# Patient Record
Sex: MALE | Race: WHITE | NOT HISPANIC OR LATINO | Employment: OTHER | URBAN - NONMETROPOLITAN AREA
[De-identification: names, ages, dates, MRNs, and addresses within clinical notes are randomized per-mention and may not be internally consistent; named-entity substitution may affect disease eponyms.]

---

## 2020-06-24 ENCOUNTER — OUTSIDE FACILITY SERVICE (OUTPATIENT)
Dept: CARDIOLOGY | Facility: CLINIC | Age: 40
End: 2020-06-24

## 2020-06-24 PROCEDURE — 93018 CV STRESS TEST I&R ONLY: CPT | Performed by: INTERNAL MEDICINE

## 2020-06-24 PROCEDURE — 78452 HT MUSCLE IMAGE SPECT MULT: CPT | Performed by: INTERNAL MEDICINE

## 2022-05-20 ENCOUNTER — OUTSIDE FACILITY SERVICE (OUTPATIENT)
Dept: CARDIOLOGY | Facility: CLINIC | Age: 42
End: 2022-05-20

## 2022-05-20 PROCEDURE — 93018 CV STRESS TEST I&R ONLY: CPT | Performed by: INTERNAL MEDICINE

## 2022-05-20 PROCEDURE — 78452 HT MUSCLE IMAGE SPECT MULT: CPT | Performed by: INTERNAL MEDICINE

## 2023-10-17 ENCOUNTER — OFFICE VISIT (OUTPATIENT)
Dept: NEUROSURGERY | Facility: CLINIC | Age: 43
End: 2023-10-17
Payer: MEDICARE

## 2023-10-17 VITALS — WEIGHT: 156.8 LBS | BODY MASS INDEX: 23.76 KG/M2 | TEMPERATURE: 97.7 F | HEIGHT: 68 IN

## 2023-10-17 DIAGNOSIS — M51.16 LUMBAR DISC HERNIATION WITH RADICULOPATHY: Primary | ICD-10-CM

## 2023-10-17 DIAGNOSIS — Z72.0 TOBACCO ABUSE: ICD-10-CM

## 2023-10-17 PROCEDURE — 1160F RVW MEDS BY RX/DR IN RCRD: CPT | Performed by: NEUROLOGICAL SURGERY

## 2023-10-17 PROCEDURE — 1159F MED LIST DOCD IN RCRD: CPT | Performed by: NEUROLOGICAL SURGERY

## 2023-10-17 PROCEDURE — 99204 OFFICE O/P NEW MOD 45 MIN: CPT | Performed by: NEUROLOGICAL SURGERY

## 2023-10-17 RX ORDER — ISOSORBIDE MONONITRATE 30 MG/1
TABLET, EXTENDED RELEASE ORAL EVERY 24 HOURS
COMMUNITY

## 2023-10-17 RX ORDER — ASPIRIN 81 MG/1
TABLET ORAL EVERY 24 HOURS
COMMUNITY

## 2023-10-17 RX ORDER — ISOSORBIDE MONONITRATE 60 MG/1
60 TABLET, EXTENDED RELEASE ORAL EVERY MORNING
COMMUNITY
Start: 2023-07-19

## 2023-10-17 RX ORDER — POTASSIUM CHLORIDE 750 MG/1
TABLET, FILM COATED, EXTENDED RELEASE ORAL
COMMUNITY
Start: 2023-10-16

## 2023-10-17 RX ORDER — TICAGRELOR 60 MG/1
TABLET ORAL EVERY 12 HOURS SCHEDULED
COMMUNITY

## 2023-10-17 RX ORDER — ATORVASTATIN CALCIUM 40 MG/1
TABLET, FILM COATED ORAL EVERY 24 HOURS
COMMUNITY

## 2023-10-17 RX ORDER — RANOLAZINE 1000 MG/1
TABLET, EXTENDED RELEASE ORAL EVERY 12 HOURS SCHEDULED
COMMUNITY

## 2023-10-17 RX ORDER — TIZANIDINE HYDROCHLORIDE 4 MG/1
TABLET ORAL EVERY 8 HOURS SCHEDULED
COMMUNITY
Start: 2023-08-15

## 2023-10-17 RX ORDER — GABAPENTIN 300 MG/1
300 CAPSULE ORAL TAKE AS DIRECTED
Qty: 90 CAPSULE | Refills: 1 | Status: SHIPPED | OUTPATIENT
Start: 2023-10-17

## 2023-10-17 RX ORDER — IBUPROFEN 800 MG/1
TABLET ORAL
COMMUNITY
Start: 2023-10-16

## 2023-10-17 NOTE — PROGRESS NOTES
Patient: Jose De Jesus Delong  : 1980    Primary Care Provider: Roberta Abrams APRN    Requesting Provider: As above        History    Chief Complaint: Low back and right leg pain.    History of Present Illness: Mr. Delong is a 43-year-old unemployed gentleman who 2 months ago was helping a friend lift a heavy nadir.  He felt a pop in his back.  He was seen in the emergency room.  His symptoms improved but have recurred somewhat.  Pain extends from his back into the right hip and then globally in the right leg although most of the pain seems to be on the back of the right thigh and calf.  His right leg will give out on him from time to time.  He has no left leg symptoms.  He denies bowel or bladder dysfunction.  He has done some physical therapy.  He has had a steroid injection and been treated with tramadol.  He is a little better lying on his right side.  He has had multiple heart attacks and has a coronary stent that was placed in 2018 or so.  He is on Brilinta.    Review of Systems   Constitutional:  Negative for activity change, appetite change, chills, diaphoresis, fatigue, fever and unexpected weight change.   HENT:  Negative for congestion, dental problem, drooling, ear discharge, ear pain, facial swelling, hearing loss, mouth sores, nosebleeds, postnasal drip, rhinorrhea, sinus pressure, sinus pain, sneezing, sore throat, tinnitus, trouble swallowing and voice change.    Eyes:  Negative for photophobia, pain, discharge, redness, itching and visual disturbance.   Respiratory:  Negative for apnea, cough, choking, chest tightness, shortness of breath, wheezing and stridor.    Cardiovascular:  Negative for chest pain, palpitations and leg swelling.   Gastrointestinal:  Negative for abdominal distention, abdominal pain, anal bleeding, blood in stool, constipation, diarrhea, nausea, rectal pain and vomiting.   Endocrine: Negative for cold intolerance, heat intolerance, polydipsia, polyphagia and polyuria.  "  Genitourinary:  Negative for decreased urine volume, difficulty urinating, dysuria, enuresis, flank pain, frequency, genital sores, hematuria, penile discharge, penile pain, penile swelling, scrotal swelling, testicular pain and urgency.   Musculoskeletal:  Positive for arthralgias and back pain. Negative for gait problem, joint swelling, myalgias, neck pain and neck stiffness.   Skin:  Negative for color change, pallor, rash and wound.   Allergic/Immunologic: Negative for environmental allergies, food allergies and immunocompromised state.   Neurological:  Positive for weakness and numbness. Negative for dizziness, tremors, seizures, syncope, facial asymmetry, speech difficulty, light-headedness and headaches.   Hematological:  Negative for adenopathy. Does not bruise/bleed easily.   Psychiatric/Behavioral:  Negative for agitation, behavioral problems, confusion, decreased concentration, dysphoric mood, hallucinations, self-injury, sleep disturbance and suicidal ideas. The patient is not nervous/anxious and is not hyperactive.        The patient's past medical history, past surgical history, family history, and social history have been reviewed at length in the electronic medical record.      Physical Exam:   Temp 97.7 °F (36.5 °C) (Infrared)   Ht 172.7 cm (68\")   Wt 71.1 kg (156 lb 12.8 oz)   BMI 23.84 kg/m²   CONSTITUTIONAL: Patient is well-nourished, pleasant and appears stated age.  MUSCULOSKELETAL:  Straight leg raising is positive on the right at about 30 degrees.  Left-sided straight leg raising induces right hip pain.  Benjie's Sign is negative.  ROM in the low back is normal.  Tenderness in the back to palpation is not observed.  NEUROLOGICAL:  Orientation, memory, attention span, language function, and cognition have been examined and are intact.  Strength is intact in the lower extremities to direct testing.  Muscle tone is normal throughout.  Station and gait are normal.  Sensation is intact to " light touch testing throughout.  Deep tendon reflexes are 1+ and symmetrical.  Coordination is intact.      Medical Decision Making    Data Review:   (All imaging studies were personally reviewed unless stated otherwise)  MRI of the lumbar spine dated 9/7/2023 demonstrates degenerative disc disease at L3-4 and L4-5 where there is some degree of prominent disc bulging or protrusion that narrows the recesses.  This is perhaps most prominent on the right at the L4-5 level.    Diagnosis:   1.  Lumbar radiculopathy.  2.  Lumbar degenerative disc disease.    Treatment Options:   I have placed the patient on gabapentin in escalating doses.  He will follow-up in our clinic in several weeks.  If his symptoms persist then we will make an arrangement for referral to a pain clinic for some blocks.  Surgery would be a consideration if his difficulties persist.  He would need to come off of his Brilinta for injections or surgery.      Scribed for Babatunde Pelayo MD by Teresa Hallman CMA on 10/17/2023 12:31 EDT       I, Dr. Pelayo, personally performed the services described in the documentation, as scribed in my presence, and it is both accurate and complete.

## 2023-11-22 ENCOUNTER — OFFICE VISIT (OUTPATIENT)
Dept: NEUROSURGERY | Facility: CLINIC | Age: 43
End: 2023-11-22
Payer: MEDICARE

## 2023-11-22 VITALS
DIASTOLIC BLOOD PRESSURE: 62 MMHG | WEIGHT: 160 LBS | TEMPERATURE: 97.7 F | BODY MASS INDEX: 24.25 KG/M2 | SYSTOLIC BLOOD PRESSURE: 118 MMHG | HEIGHT: 68 IN

## 2023-11-22 DIAGNOSIS — M51.16 LUMBAR DISC HERNIATION WITH RADICULOPATHY: Primary | ICD-10-CM

## 2023-11-22 DIAGNOSIS — Z72.0 TOBACCO ABUSE: ICD-10-CM

## 2023-11-22 DIAGNOSIS — M51.36 DISC DEGENERATION, LUMBAR: ICD-10-CM

## 2023-11-22 PROCEDURE — 99213 OFFICE O/P EST LOW 20 MIN: CPT | Performed by: PHYSICIAN ASSISTANT

## 2023-11-22 PROCEDURE — 1159F MED LIST DOCD IN RCRD: CPT | Performed by: PHYSICIAN ASSISTANT

## 2023-11-22 PROCEDURE — 1160F RVW MEDS BY RX/DR IN RCRD: CPT | Performed by: PHYSICIAN ASSISTANT

## 2023-11-22 RX ORDER — NITROGLYCERIN 0.4 MG/1
0.4 TABLET SUBLINGUAL
COMMUNITY

## 2023-11-22 RX ORDER — GABAPENTIN 600 MG/1
600 TABLET ORAL 3 TIMES DAILY
Qty: 90 TABLET | Refills: 1 | Status: SHIPPED | OUTPATIENT
Start: 2023-11-22

## 2023-11-22 NOTE — PROGRESS NOTES
Outside MRI disc of lumbar spine loaded into pt's chart and returned to pt in exam room.    I will STOP taking the medications listed below when I get home from the hospital:  None

## 2023-11-22 NOTE — PROGRESS NOTES
Patient: Jose De Jesus Delong  : 1980  Chart #: [id number]    Date of Service: 2023    CHIEF COMPLAINT: Low back and right leg pain    History of Present Illness Mr. Delong is seen in follow-up.  He is a 43-year-old gentleman who has a history of multiple MIs with a stent placed in 2018.  He is on Brilinta.  Around April of this year he was helping a friend lift a heavy nadir when he felt a pop in his back followed by pain radiating down the right leg.  He was evaluated in the emergency room.  Symptoms improved but then recurred somewhat.  His right leg will give out on him on occasion.  No significant left leg symptoms.  No bowel or bladder difficulties.  He has been treated with physical therapy.  He has been treated with steroids and tramadol.  He feels a little better lying on his right side.  More recently Dr. Pelayo prescribed Neurontin which has helped modestly.      Past Medical History:   Diagnosis Date    Carotid artery occlusion     Cervical disc disorder     Coronary artery disease 2018    Heart attack     x3         Current Outpatient Medications:     aspirin 81 MG EC tablet, Daily., Disp: , Rfl:     atorvastatin (LIPITOR) 40 MG tablet, Daily., Disp: , Rfl:     Brilinta 60 MG tablet tablet, Every 12 (Twelve) Hours., Disp: , Rfl:     gabapentin (NEURONTIN) 300 MG capsule, Take 1 capsule by mouth Take As Directed. 1 nightly for 3 days, 1 twice a day for 3 days, 1 three times a day thereafter, Disp: 90 capsule, Rfl: 1    ibuprofen (ADVIL,MOTRIN) 800 MG tablet, , Disp: , Rfl:     isosorbide mononitrate (IMDUR) 30 MG 24 hr tablet, Daily., Disp: , Rfl:     metoprolol tartrate (LOPRESSOR) 25 MG tablet, Every 12 (Twelve) Hours., Disp: , Rfl:     nitroglycerin (NITROSTAT) 0.4 MG SL tablet, 1 tablet., Disp: , Rfl:     potassium chloride 10 MEQ CR tablet, , Disp: , Rfl:     ranolazine (RANEXA) 1000 MG 12 hr tablet, Every 12 (Twelve) Hours., Disp: , Rfl:     Zanaflex 4 MG tablet, Every 8 (Eight) Hours.,  "Disp: , Rfl:     gabapentin (NEURONTIN) 600 MG tablet, Take 1 tablet by mouth 3 (Three) Times a Day., Disp: 90 tablet, Rfl: 1    Past Surgical History:   Procedure Laterality Date    CAROTID STENT      CORONARY STENT PLACEMENT  03/2018    \" maker\"       Social History     Socioeconomic History    Marital status:    Tobacco Use    Smoking status: Every Day     Packs/day: 2.00     Years: 30.00     Additional pack years: 0.00     Total pack years: 60.00     Types: Cigarettes    Smokeless tobacco: Never   Substance and Sexual Activity    Alcohol use: Never    Drug use: Never    Sexual activity: Defer         Review of Systems   Constitutional:  Negative for activity change, appetite change, chills, diaphoresis, fatigue, fever and unexpected weight change.   HENT:  Negative for congestion, dental problem, drooling, ear discharge, ear pain, facial swelling, hearing loss, mouth sores, nosebleeds, postnasal drip, rhinorrhea, sinus pressure, sinus pain, sneezing, sore throat, tinnitus, trouble swallowing and voice change.    Eyes:  Negative for photophobia, pain, discharge, redness, itching and visual disturbance.   Respiratory:  Negative for apnea, cough, choking, chest tightness, shortness of breath, wheezing and stridor.    Cardiovascular:  Negative for chest pain, palpitations and leg swelling.   Gastrointestinal:  Positive for abdominal pain. Negative for abdominal distention, anal bleeding, blood in stool, constipation, diarrhea, nausea, rectal pain and vomiting.   Endocrine: Negative for cold intolerance, heat intolerance, polydipsia, polyphagia and polyuria.   Genitourinary:  Negative for decreased urine volume, difficulty urinating, dysuria, enuresis, flank pain, frequency, genital sores, hematuria, penile discharge, penile pain, penile swelling, scrotal swelling, testicular pain and urgency.   Musculoskeletal:  Positive for back pain and myalgias. Negative for arthralgias, gait problem, joint swelling, " "neck pain and neck stiffness.   Skin:  Negative for color change, pallor, rash and wound.   Allergic/Immunologic: Positive for food allergies. Negative for environmental allergies and immunocompromised state.   Neurological:  Positive for numbness. Negative for dizziness, tremors, seizures, syncope, facial asymmetry, speech difficulty, weakness, light-headedness and headaches.   Hematological:  Negative for adenopathy. Does not bruise/bleed easily.   Psychiatric/Behavioral:  Negative for agitation, behavioral problems, confusion, decreased concentration, dysphoric mood, hallucinations, self-injury, sleep disturbance and suicidal ideas. The patient is not nervous/anxious and is not hyperactive.        Objective   Vital Signs: Blood pressure 118/62, temperature 97.7 °F (36.5 °C), temperature source Infrared, height 172.7 cm (68\"), weight 72.6 kg (160 lb).  Physical Exam  Vitals and nursing note reviewed.   Constitutional:       General: He is not in acute distress.     Appearance: He is well-developed.   HENT:      Head: Normocephalic and atraumatic.   Psychiatric:         Behavior: Behavior normal.         Thought Content: Thought content normal.     Musculoskeletal:     Strength is intact in upper and lower extremities to direct testing.     Station and gait are normal.     Straight leg raising is negative.   Neurologic:     Muscle tone is normal throughout.     Coordination is intact.     Deep tendon reflexes: 2+ and symmetrical.     Sensation is intact to light touch throughout.     Patient is oriented to person, place, and time.         Independent review of radiographic imaging: MRI of the lumbar spine dated 9/7/2023 demonstrates degenerative disc disease most pronounced at L3-4 and L4-5.  There is some disc protrusion that narrows the recess on the right at L4-5 which could be the source for his symptoms.  There is some rightward narrowing at L3-4 to a lesser extent.    Assessment & Plan   Diagnosis: Lumbar " degenerative disc disease with radiculopathy    Medical Decision Making: I am going to refer patient to the pain clinic for an epidural or 2.  Gabapentin was increased to 600 3 times daily.  Ultimately if symptoms do not improve then surgery is a consideration.  Per his report, he has been cleared to come off of Brilinta for 5 days prior to procedure.              Diagnoses and all orders for this visit:    1. Lumbar disc herniation with radiculopathy (Primary)  -     Cancel: Ambulatory Referral to Pain Management  -     gabapentin (NEURONTIN) 600 MG tablet; Take 1 tablet by mouth 3 (Three) Times a Day.  Dispense: 90 tablet; Refill: 1  -     Ambulatory Referral to Pain Management    2. Tobacco abuse  -     Cancel: Ambulatory Referral to Pain Management  -     gabapentin (NEURONTIN) 600 MG tablet; Take 1 tablet by mouth 3 (Three) Times a Day.  Dispense: 90 tablet; Refill: 1  -     Ambulatory Referral to Pain Management    3. Disc degeneration, lumbar                        BMI is within normal parameters. No other follow-up for BMI required.         Yelena Calderón PA-C  Patient Care Team:  Roberta Abrams APRN as PCP - General (Family Medicine)

## 2024-01-10 NOTE — PROGRESS NOTES
"Chief Complaint: \"Lower back and right leg pain\"      History of Present Illness:   Patient: Mr. Jose De Jesus Delong, 43 y.o. male   Referring Physician: Yelena Calderón PA-C   Reason for Referral: Consultation for chronic intractable lower back and right lower extremity pain.   Pain History: Jose De Jesus Delong reports a 10-month history of chronic intractable lower back and right lower extremity pain, which began around April 2023 when he was helping a friend lift a heavy nadir. He felt a pop in his back followed by severe pain radiating down his right leg. Jose De Jesus Delong was evaluated in the emergency room.  He experienced transient improvement in his symptoms. Unfortunately, his pain recurred thereafter. He complains of lower back pain radiating into the right posterior thigh and right calf with tingling in his right foot associated with intermittent RLE weakness and neurogenic claudication. He denies significant left lower extremity symptoms. He denies bowel or bladder problems. 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 He is on ASA and Brilinta. MRI of the lumbar spine without contrast on 09/07/2023 revealed multilevel disc disease, facet hypertrophy and ligamentum flavum hypertrophy most pronounced at L3-L4 and L4-L5. At L3-L4: Disc bulge, spurring of the endplates, facet hypertrophy, ligamentum flavum hypertrophy. Mild canal stenosis and moderate right lateral recess and neuroforaminal stenosis. At L4-L5: Moderate spurring of the endplates, facet hypertrophy, ligamentum flavum hypertrophy, disc bulge with disc protrusion that narrows the right lateral recess. Moderate canal stenosis, moderate lateral recess and neuroforaminal stenosis. The findings at L4-L5 and perhaps L3-L4 could provide clinical and radiological correlation. Pain has progressed in intensity over the past months. Jose De Jesus Delong failed to obtain pain " relief with conservative measures for more than 9 months including oral analgesics, Neurontin, steroids, opioids (tramadol), topical analgesics, ice, heat, physical therapy (last visit within the past 6 months), physical therapist directed home exercise program HEP (ongoing), to name a few. Jose De Jesus Delong underwent neurosurgical consultation with Yelena Calderón PA-C on 11/22/2023, and was found not to be a surgical candidate. if symptoms do not improve, then surgery could be considered. The neurosurgical team has referred the patient in consultation for selective epidural steroid injections  Pain Description: Constant lower back pain with intermittent exacerbation, described as aching, dull, sharp, throbbing, and burning sensation.   Radiation of Pain: The pain radiates into the right gluteal region, right posterior and lateral thigh and right calf  Pain intensity today: 6/10   Average pain intensity last week: 3/10  Pain intensity ranges from: 4/10 to 10/10  Aggravating factors: Pain increases with lifting, protracted sitting, standing, walking. Patient describes neurogenic claudication. Patient does not use a cane or walker   Alleviating factors: Pain decreases with sitting on a recliner, lying down on his right side  Associated Symptoms:   Patient reports pain, numbness, weakness in the right lower extremity. Patient denies symptoms in the opposite limb  Patient denies any new bladder or bowel problems.   Patient reports difficulties with his balance but denies recent falls.   Pain interferes with general activities (ability to walk, stand, transition from different positions), and affects patient's quality of life  Pain interferes with sleep: falling asleep and causing sleep fragmentation   Muscle spasms: RLE  Stiffness: Lower back and right leg    Review of previous therapies and additional medical records:  Jose De Jesus Delong has already failed the following measures, including:   Conservative Measures: Oral  analgesics, Neurontin, steroids, opioids (tramadol), topical analgesics, ice, heat, physical therapy (last visit within the past 6 months), physical therapist directed home exercise program HEP (ongoing)  Interventional Measures: None  Surgical Measures: No history of previous cervical spine, lumbar spine or hip surgery   Jose De Jesus Delong underwent neurosurgical consultation with Yelena Calderón PA-C on 11/22/2023, and was found not to be a surgical candidate. if symptoms do not improve, then surgery could be considered.    Jose De Jesus Delong presents with significant comorbidities including history of carotid artery occlusion, coronary artery disease s/p coronary stent placement (03/2018) and carotid stent placement, on aspirin 81 mg, Brilinta   In terms of current analgesics, Jose De Jesus Delong takes: Gabapentin, ibuprofen, Zanaflex   I have reviewed Kamari Report consistent with medication reconciliation.  SOAPP/ORT: Low Risk     PHQ-9 Depression Screening  Little interest or pleasure in doing things? 2-->more than half the days   Feeling down, depressed, or hopeless? 0-->not at all   Trouble falling or staying asleep, or sleeping too much? 2-->more than half the days   Feeling tired or having little energy? 2-->more than half the days   Poor appetite or overeating? 2-->more than half the days   Feeling bad about yourself - or that you are a failure or have let yourself or your family down? 0-->not at all   Trouble concentrating on things, such as reading the newspaper or watching television? 0-->not at all   Moving or speaking so slowly that other people could have noticed? Or the opposite - being so fidgety or restless that you have been moving around a lot more than usual? 1-->several days   Thoughts that you would be better off dead, or of hurting yourself in some way? 0-->not at all   PHQ-9 Total Score 9   If you checked off any problems, how difficult have these problems made it for you to do your work, take  care of things at home, or get along with other people?        Pain Self-Efficacy Questionnaire (PSEQ)  ITEM 01-11 2024        I can enjoy things despite the pain. 3        I can do most of the household chores (tidying up, washing dishes, etc), despite the pain. 4        I can socialize with my friends or family members as often as I used to do, despite the pain. 2        I can cope with my pain in most situations. 1        I can do some form of work, despite the pain (includes housework, paid, and unpaid work). 1        I can still do many of the things I enjoy doing, such as hobbies or leisure activity despite pain. 1        I can cope with my pain without medications. 1        I can accomplish most of my goals in life despite the pain. 1        I can live in a normal lifestyle, despite the pain. 1        I can gradually become more active, despite the pain. 1        TOTAL SCORE 16/60            Global Pain Scale 01-11 2024          Pain 19          Feelings 5          Clinical outcomes 11          Activities 14          GPS Total: 49              The [place] Back Pain Disability Scale   DATE 01-11 2024          Sleep through the night 3          Turn over in bed 3          Get out of bed 3          Make your bed 2          Put on socks (pantyhose) 3          Ride in a car 2          Sit in a chair for several hours 2          Stand up for 20-30 minutes 2          Climb one flight of stairs 2          Walk a few blocks (200-300 yards)  2          Walk several miles 3          Run one block (about 50 yards) 3          Take food out of the refrigerator 2          Reach up to high shelves 2          Move a chair 2          Pull or push heavy doors 2          Bend over to clean the bathtub 2          Throw a ball 1          Carry two bags of groceries 2          Lift and carry a heavy suitcase 3          Total score 46            Review of Diagnostic Studies:  I have independently reviewed and interpreted the  images with the patient and used the images and a tridimensional spine model to explain findings. I have also reviewed the reports.  MRI of the lumbar spine without contrast on 09/07/2023 revealed preservation of vertebral body heights and alignment. Multilevel disc disease, facet hypertrophy and ligamentum flavum hypertrophy most pronounced at L3-L4 and L4-L5. Axial imaging:  T11-T12, T12-L1, L1-L2, L2-L3: No significant canal or foraminal stenosis  L3-L4: Disc bulge, spurring of the endplates, facet hypertrophy, ligamentum flavum hypertrophy. Mild canal stenosis and moderate right lateral recess and neuroforaminal stenosis  L4-L5: Moderate spurring of the endplates, facet hypertrophy, ligamentum flavum hypertrophy, disc bulge with disc protrusion that narrows the right lateral recess. Moderate canal stenosis, moderate lateral recess and neuroforaminal stenosis  L5-S1: Facet hypertrophy, ligamentum flavum hypertrophy.  No significant canal lateral recess or neuroforaminal stenosis.     Review of Systems   Respiratory:  Positive for shortness of breath and wheezing.    Cardiovascular:  Positive for chest pain, palpitations and leg swelling.   Gastrointestinal:  Positive for abdominal pain.   Musculoskeletal:  Positive for arthralgias, back pain, myalgias, neck pain and neck stiffness.   Allergic/Immunologic: Positive for food allergies.   Neurological:  Positive for weakness and numbness.   All other systems reviewed and are negative.        Patient Active Problem List   Diagnosis    Lumbar disc herniation with radiculopathy    Lumbar stenosis with neurogenic claudication    Degeneration of lumbar or lumbosacral intervertebral disc    Current every day smoker    Encounter for smoking cessation counseling    History of coronary artery stent placement       Past Medical History:   Diagnosis Date    Carotid artery occlusion     Cervical disc disorder     Coronary artery disease 2018    Heart attack     x3         Past  "Surgical History:   Procedure Laterality Date    CAROTID STENT      CORONARY STENT PLACEMENT  03/2018    \" maker\"         History reviewed. No pertinent family history.      Social History     Socioeconomic History    Marital status:    Tobacco Use    Smoking status: Every Day     Packs/day: 2.00     Years: 30.00     Additional pack years: 0.00     Total pack years: 60.00     Types: Cigarettes    Smokeless tobacco: Never   Substance and Sexual Activity    Alcohol use: Never    Drug use: Never    Sexual activity: Defer           Current Outpatient Medications:     aspirin 81 MG EC tablet, Daily., Disp: , Rfl:     atorvastatin (LIPITOR) 40 MG tablet, Daily., Disp: , Rfl:     Brilinta 60 MG tablet tablet, Every 12 (Twelve) Hours., Disp: , Rfl:     gabapentin (NEURONTIN) 600 MG tablet, Take 1 tablet by mouth 3 (Three) Times a Day., Disp: 90 tablet, Rfl: 1    ibuprofen (ADVIL,MOTRIN) 800 MG tablet, , Disp: , Rfl:     isosorbide mononitrate (IMDUR) 30 MG 24 hr tablet, Daily., Disp: , Rfl:     metoprolol tartrate (LOPRESSOR) 25 MG tablet, Every 12 (Twelve) Hours., Disp: , Rfl:     nitroglycerin (NITROSTAT) 0.4 MG SL tablet, 1 tablet., Disp: , Rfl:     potassium chloride 10 MEQ CR tablet, , Disp: , Rfl:     ranolazine (RANEXA) 1000 MG 12 hr tablet, Every 12 (Twelve) Hours., Disp: , Rfl:     Zanaflex 4 MG tablet, Every 8 (Eight) Hours., Disp: , Rfl:     Alpha Lipoic Acid 200 MG capsule, Take 400 mg by mouth 3 (Three) Times a Day., Disp: 180 capsule, Rfl: 1    Dietary Management Product (Rheumate) capsule, Take 1 capsule by mouth Daily., Disp: 90 capsule, Rfl: 0    nortriptyline (PAMELOR) 10 MG capsule, 1-2 tabs po qhs prn sleep, Disp: 60 capsule, Rfl: 0    vitamin B-6 (PYRIDOXINE) 100 MG tablet, Take 1 tablet by mouth Daily., Disp: 30 tablet, Rfl: 0      Allergies   Allergen Reactions    Shellfish-Derived Products Anaphylaxis    Bee Venom Swelling         Ht 172.7 cm (68\")   Wt 76.3 kg (168 lb 3.2 oz)   BMI " 25.57 kg/m²       Physical Exam:  Constitutional: Patient appears well-developed, well-nourished, well-hydrated  HEENT: Head: Normocephalic and atraumatic  Eyes: Conjunctivae and lids are normal  Pupils: Equal, round, reactive to light  Peripheral vascular exam: Femoral: right 2+, left 2+. Posterior tibialis: right 2+ and left 2+. Dorsalis pedis: right 2+ and left 2+. CRT: Nl.  No edema.   Musculoskeletal   Gait and station: Gait evaluation demonstrated an antalgic gait. Difficulties walking on heels or toes due to increased RLE pain  Lumbar Spine: Passive and active range of motion are limited secondary to pain. Forward flexion of the lumbar spine increased and reproduced pain (gluteal/posterior thigh). Lumbar facet joint loading maneuvers are equivocal.  Sacroiliac Joints: Benjie's test; Gaenslen's test; thigh thrust test; SI compression test; posterior shear test; SI distraction test; pelvic rock test; Yeoman's test; Negative   Piriformis maneuvers: Negative   Right Hip Joint: The range of motion of the hip joint is limited to flexion and internal rotation but without pain   Left Hip Joint: The range of motion of the hip joint is limited to flexion and internal rotation but without pain   Neurological:   Patient is alert and oriented to person, place, and time.   Speech: Normal.   Cortical function: Normal mental status.   Reflex Scores:  Right patellar: 1+  Left patellar: 1+  Right Achilles: 0+  Left Achilles: 0+  Motor strength: 5/5  Motor Tone: Normal  Involuntary movements: None.   Superficial/Primitive Reflexes: Primitive reflexes were absent.   Right Escobar: Absent  Left Escobar: Absent  Right ankle clonus: Absent  Left ankle clonus: Absent   Babinsky: Absent  Long tract signs: Negative. Straight leg raising test: Negative on the left. Positive on the right at 30-40 degrees with positive Lasegue. Positive contralateral SLR. Femoral stretch sign: Negative.   Sensory exam: Intact to light touch, intact pain  and temperature sensation, intact vibration sensation and normal proprioception  Coordination: Finger to nose: Normal. Balance: Normal Romberg's sign: Negative  Skin and subcutaneous tissue: Skin is warm and intact. No rash noted. No cyanosis.   Psychiatric: Judgment and insight: Normal. Recent and remote memory: Intact. Mood and affect: Normal.     ASSESSMENT:   1. Lumbar disc herniation with radiculopathy    2. Degeneration of lumbar or lumbosacral intervertebral disc    3. Lumbar stenosis with neurogenic claudication    4. History of coronary artery stent placement    5. Current every day smoker    6. Encounter for smoking cessation counseling      PLAN/MEDICAL DECISION MAKING:  Mr. Jose De Jesus Delong, 43 y.o. male presents with a 10-month history of chronic intractable lower back and right lower extremity pain, which began when he was helping a friend lift a heavy nadir. He felt a pop in his back followed by severe pain radiating down his right leg. He complains of lower back pain radiating into the right posterior thigh and right calf with tingling in his right foot associated with intermittent RLE weakness and neurogenic claudication. He denies significant left lower extremity symptoms. He denies bowel or bladder problems. He presents with significant comorbidities including a history of MI s/p coronary artery stent placement. He is on ASA and Brilinta. Jose De Jesus Delong failed to obtain pain relief with conservative measures for more than 9 months including oral analgesics, Neurontin, steroids, opioids (tramadol), topical analgesics, ice, heat, physical therapy (last visit within the past 6 months), physical therapist directed home exercise program HEP (ongoing), to name a few. Pain has progressed in intensity over the past months. MRI of the lumbar spine without contrast on 09/07/2023 revealed multilevel disc disease, facet hypertrophy and ligamentum flavum hypertrophy most pronounced at L3-L4 and L4-L5. At L3-L4:  Disc bulge, spurring of the endplates, facet hypertrophy, ligamentum flavum hypertrophy. Mild canal stenosis and moderate right lateral recess and neuroforaminal stenosis. At L4-L5: Moderate spurring of the endplates, facet hypertrophy, ligamentum flavum hypertrophy, disc bulge with disc protrusion that narrows the right lateral recess. Moderate canal stenosis, moderate lateral recess and neuroforaminal stenosis. The findings at L3-L4 and L4-L5 could provide clinical and radiological correlation. Jose De Jesus Delong underwent neurosurgical consultation with Yelena Calderón PA-C on 11/22/2023, and was found not to be a surgical candidate. if symptoms do not improve, then surgery could be considered. The neurosurgical team has referred the patient in consultation for selective epidural steroid injections. A comprehensive evaluation including history and physical exam along with pertinent physiologic and functional assessment was performed. Patient presents with intractable pain due to the diagnoses listed above. Patient has failed to respond to conservative modalities, as referenced under HPI. I have documented the impact of patient's moderate-to-severe pain contributing to significant impairment in daily activities, ADLs, and a negative impact on the patient's quality of life, as reflected on Global Pain Scale 49/100; The [place] Back Pain Disability Scale 46/100; Tinetti Gait & Balance Assessment Tool  (low risk for falls). I have reviewed pertinent supporting diagnostic studies of patient's chronic pain condition as well as all available pertinent medical records to patient's chronic pain condition including previous therapies, as referenced above. PHQ-9 Depression Screening 9; Pain Self-Efficacy Questionnaire (PSEQ) 16/60.  I had a lengthy conversation with Mr. Jose De Jesus Delong regarding his chronic pain condition and potential therapeutic options including risks, benefits, alternative therapies, to name a few. We  have discussed using a stepwise approach starting with the least intense level of care as determined by the extent required to diagnose and or treat a patient's condition. The proposed treatments are consistent with the patient's medical condition and known to be safe and effective by current guidelines and the standard of care. The duration and frequency proposed are considered appropriate for the service in accordance with accepted standards of medical practice for the diagnosis and treatment of the patient's condition and intended to improve the patient's level of function. These services will be furnished in a setting appropriate to the patient's medical needs and condition. Therefore, I have proposed the following plan:    1. Interventional pain management measures: Patient will need to stop ticagrelor (Brilinta) at least 7 days between last dose and procedure (patient reports that he received clearance from his cardiology in [place] Dr. Ramirez). Patient will be scheduled for diagnostic and therapeutic right L3-L4 and right L4-L5 transforaminal epidural steroid injections with the addition of hyaluronidase using the lowest effective dose of steroids, under C-arm fluoroscopic guidance, with the use of contrast dye (unless contraindicated) to confirm appropriate needle placement and spread of contrast dye. We may repeat therapeutic right L3-L4 and right L4-L5 transforaminal epidural steroid injections with the addition of hyaluronidase depending on patient's outcome and following current guidelines: Epidurals will be limited to a maximum of 4 sessions per spinal region in a rolling twelve (12) month period. Continuation of epidural steroid injections over 12 months would only be considered under the following provisions;  Patient is a high-risk surgical candidate, or the patient does not desire surgery, or recurrence of pain in the same location relieved with ESIs for at least three months and epidural provides  at least 50% sustained improvement of pain and/or 50% objective improvement in function (using same scale as baseline)  Pain is severe enough to cause a significant degree of functional disability or vocational disability  The primary care provider will be notified regarding continuation of procedures and repeat steroid use   Patient will follow-up with Dr. Pelayo thereafter.   Other options for treatment of patient's pain would include ViaDisc, Vertiflex, MILD, PNS Sprint, SCS,     2. Diagnostic studies:   A. Lumbar spine X-rays, full views including flexion and extension to assess lumbar stability and potential transitional anatomy  B. Patient may need lumbar myelogram followed by CT post-myelogram along with EMG/NCV of the bilateral lower extremities if he continues to struggle with pain  C. Patient may need arterial duplex Doppler of the lower extremities with ABIs   D. Patient will need MRI of the thoracic spine without contrast to assess capacity and patency of the spinal canal and epidural space prior to spinal cord stimulator trial and implant  E. Patient will need CBC, PT, PTT prior to MILD, SCS trial    3. Pharmacological measures: Reviewed and discussed;   A. Patient takes ibuprofen, gabapentin, tizanidine  B. Trial with Rheumate one tablet once daily (please provide samples)  C. Start pyridoxine 100 mg one tablet by mouth daily take for 30 days, #30, no refills  D. Start alpha lipoid acid 5478-9300 mg per day divided into 3 doses  E. Trial with nortriptyline 10 mg 1-2 tablets at bedtime, #60, 1 refill    4. Long-term rehabilitation efforts:  A. The patient does not have a history of falls. Also, I performed a risk assessment for falls using the Tinetti gait & balance assessment tool (scored low risk for falls).   B. Patient will start a comprehensive physical therapy program for Alter-G, water therapy, gait and balance training, neurodynamics, core strengthening, gluteal and abductor strengthening,  ultrasound, ASTYM, E-STIM, myofascial release, cupping, dry needling, home exercise program, 2-3 x per week for 8 weeks  C. Contrast therapy: Apply ice-packs for 15-20 minutes, followed by heating pads for 15-20 minutes to affected area   D. Start a low impact exercise program such as water therapy, swimming,  yoga, Pilates  E. I have prescribed a home exercise program with instructions. I have spent a significant amount of time talking with the patient and providing specific recommendations regarding lifestyle modification, preventive measures, and self-care management of chronic pain.  In addition, I have provided a copy of the booklet Care of the back by Prasad Borja MD and Keerthi Gilbert MD to be used as a physician supervised home exercise program  F. Prior to consideration of SCS: Patient will need a referral to Dr. Rober Costa for psychological screening for spinal cord stimulation and intrathecal therapies.  CIERRA Delong  reports that he has been smoking cigarettes. He has a 60.00 pack-year smoking history. He has never used smokeless tobacco.. I have educated him on the risk of diseases from using tobacco products such as cancer, COPD, and heart disease. I advised him to quit and he is willing to quit. He has failed Chantix, NRT, etc. I spent 5 minutes counseling the patient. I have recommended to contact the Smoking Cessation Program: Quit now [place]: Enroll at www.quitnow[place].org or call 5-784-QUIT-NOW (485-0255). The program provides a holistic approach to smoking cessation including NRT, coaching, etc    5. The patient has been instructed to contact my office with any questions or difficulties. The patient understands the plan and agrees to proceed accordingly.    The patient has a documented plan of care to address chronic pain. Jose De Jesus Delong reports a pain score of 6-7/10.  Given his pain assessment as noted, treatment options were discussed and the following options were decided upon  as a follow-up plan to address the patient's pain: continuation of current treatment plan for pain, educational materials on pain management, home exercises and therapy, prescription for non-opiod analgesics, referral to Physical Therapy, referral to specialist for assistance in pain treatment guidance, steroid injections, use of non-medical modalities (ice, heat, stretching and/or behavior modifications), and interventional pain management measures .             Pain Management Panel           No data to display                 MALACHI query complete. MALACHI reviewed by Leandro Covington MD.     Pain Medications               aspirin 81 MG EC tablet Daily.    gabapentin (NEURONTIN) 600 MG tablet Take 1 tablet by mouth 3 (Three) Times a Day.    ibuprofen (ADVIL,MOTRIN) 800 MG tablet     Zanaflex 4 MG tablet Every 8 (Eight) Hours.             No orders of the defined types were placed in this encounter.       Time spent pre-charting, face-to-face with the patient, reviewing diagnostic studies, consultation reports, previous treatments, ordering diagnostic studies, referrals, and writing this note: Total Time: 84 minutes    Please note that portions of this note were completed with a voice recognition program.   Any copied data in any portion of my note has been reviewed by myself and accurate.     The 21st Century Cures Act makes medical notes like this available to patients in the interest of transparency. This is a medical document intended as peer to peer communication. It is written in medical language and may contain abbreviations or verbiage that are unfamiliar. It may appear blunt or direct. Medical documents are intended to carry relevant information, facts as evident, and the clinical opinion of the practitioner.     Leandro Covington MD    Patient Care Team:  Roberta Abrams APRN as PCP - General (Family Medicine)  Leandro Covington MD as Consulting Physician (Pain Medicine)     No orders of the defined  types were placed in this encounter.        Future Appointments   Date Time Provider Department Center   1/30/2024  2:15 PM Yelena Calderón PA-C [facility]

## 2024-01-11 ENCOUNTER — OFFICE VISIT (OUTPATIENT)
Dept: PAIN MEDICINE | Facility: CLINIC | Age: 44
End: 2024-01-11
Payer: MEDICARE

## 2024-01-11 VITALS — WEIGHT: 168.2 LBS | BODY MASS INDEX: 25.49 KG/M2 | HEIGHT: 68 IN

## 2024-01-11 DIAGNOSIS — Z71.6 ENCOUNTER FOR SMOKING CESSATION COUNSELING: ICD-10-CM

## 2024-01-11 DIAGNOSIS — M51.16 LUMBAR DISC HERNIATION WITH RADICULOPATHY: Primary | ICD-10-CM

## 2024-01-11 DIAGNOSIS — M51.37 DEGENERATION OF LUMBAR OR LUMBOSACRAL INTERVERTEBRAL DISC: ICD-10-CM

## 2024-01-11 DIAGNOSIS — Z95.5 HISTORY OF CORONARY ARTERY STENT PLACEMENT: ICD-10-CM

## 2024-01-11 DIAGNOSIS — M51.16 LUMBAR DISC HERNIATION WITH RADICULOPATHY: ICD-10-CM

## 2024-01-11 DIAGNOSIS — F17.200 CURRENT EVERY DAY SMOKER: ICD-10-CM

## 2024-01-11 DIAGNOSIS — M48.062 LUMBAR STENOSIS WITH NEUROGENIC CLAUDICATION: ICD-10-CM

## 2024-01-11 RX ORDER — ME-TETRAHYDROFOLATE/B12/HRB236 1-1-500 MG
1 CAPSULE ORAL DAILY
Qty: 90 CAPSULE | Refills: 0 | Status: SHIPPED | OUTPATIENT
Start: 2024-01-11 | End: 2024-01-11 | Stop reason: SDUPTHER

## 2024-01-11 RX ORDER — MULTIVITAMIN WITH IRON
100 TABLET ORAL DAILY
Qty: 30 TABLET | Refills: 0 | Status: SHIPPED | OUTPATIENT
Start: 2024-01-11

## 2024-01-11 RX ORDER — ME-TETRAHYDROFOLATE/B12/HRB236 1-1-500 MG
1 CAPSULE ORAL DAILY
Qty: 90 CAPSULE | Refills: 0 | Status: SHIPPED | OUTPATIENT
Start: 2024-01-11

## 2024-01-11 RX ORDER — NORTRIPTYLINE HYDROCHLORIDE 10 MG/1
CAPSULE ORAL
Qty: 60 CAPSULE | Refills: 0 | Status: SHIPPED | OUTPATIENT
Start: 2024-01-11

## 2024-01-11 RX ORDER — ST. JOHN'S WORT 300 MG
400 CAPSULE ORAL 3 TIMES DAILY
Qty: 180 CAPSULE | Refills: 1 | Status: SHIPPED | OUTPATIENT
Start: 2024-01-11

## 2024-01-12 ENCOUNTER — TELEPHONE (OUTPATIENT)
Dept: PAIN MEDICINE | Facility: CLINIC | Age: 44
End: 2024-01-12
Payer: MEDICARE

## 2024-01-12 NOTE — TELEPHONE ENCOUNTER
Received a call from [facility], Dr Martínez's office regarding a blood thinner clearance letter sent on 1/11/2024 that the patient has never been seen in their office.

## 2024-01-19 ENCOUNTER — TELEPHONE (OUTPATIENT)
Dept: PAIN MEDICINE | Facility: CLINIC | Age: 44
End: 2024-01-19
Payer: MEDICARE

## 2024-01-22 ENCOUNTER — DOCUMENTATION (OUTPATIENT)
Dept: PAIN MEDICINE | Facility: CLINIC | Age: 44
End: 2024-01-22
Payer: MEDICARE

## 2024-01-22 NOTE — PROGRESS NOTES
Received clearance from Dr. Hernandez's office for pt hold Brilinta 7 days prior to procedure. Spoke with pt, he is aware and has been holding his Brilinta. No further needs expressed.

## 2024-01-24 ENCOUNTER — OUTSIDE FACILITY SERVICE (OUTPATIENT)
Dept: PAIN MEDICINE | Facility: CLINIC | Age: 44
End: 2024-01-24
Payer: MEDICARE

## 2024-01-24 PROCEDURE — 64483 NJX AA&/STRD TFRM EPI L/S 1: CPT | Performed by: ANESTHESIOLOGY

## 2024-01-24 PROCEDURE — 64484 NJX AA&/STRD TFRM EPI L/S EA: CPT | Performed by: ANESTHESIOLOGY

## 2024-01-24 PROCEDURE — 99152 MOD SED SAME PHYS/QHP 5/>YRS: CPT | Performed by: ANESTHESIOLOGY

## 2024-01-30 ENCOUNTER — OFFICE VISIT (OUTPATIENT)
Dept: NEUROSURGERY | Facility: CLINIC | Age: 44
End: 2024-01-30
Payer: MEDICARE

## 2024-01-30 VITALS
BODY MASS INDEX: 25.4 KG/M2 | WEIGHT: 167.6 LBS | HEIGHT: 68 IN | SYSTOLIC BLOOD PRESSURE: 136 MMHG | TEMPERATURE: 98.2 F | DIASTOLIC BLOOD PRESSURE: 80 MMHG

## 2024-01-30 DIAGNOSIS — Z72.0 TOBACCO ABUSE: ICD-10-CM

## 2024-01-30 DIAGNOSIS — M51.16 LUMBAR DISC HERNIATION WITH RADICULOPATHY: ICD-10-CM

## 2024-01-30 PROCEDURE — 1159F MED LIST DOCD IN RCRD: CPT | Performed by: PHYSICIAN ASSISTANT

## 2024-01-30 PROCEDURE — 99214 OFFICE O/P EST MOD 30 MIN: CPT | Performed by: PHYSICIAN ASSISTANT

## 2024-01-30 PROCEDURE — 1160F RVW MEDS BY RX/DR IN RCRD: CPT | Performed by: PHYSICIAN ASSISTANT

## 2024-01-30 RX ORDER — GABAPENTIN 600 MG/1
600 TABLET ORAL 3 TIMES DAILY
Qty: 90 TABLET | Refills: 1 | Status: SHIPPED | OUTPATIENT
Start: 2024-01-30

## 2024-01-30 NOTE — PROGRESS NOTES
Patient: Jose De Jesus Delong  : 1980  Chart #: [id number]    Date of Service: 2023    CHIEF COMPLAINT: Low back and right leg pain    History of Present Illness Mr. Delong is seen in follow-up.  He is a 43-year-old gentleman who has a history of multiple MIs with a stent placed in 2018.  He is on Brilinta.  Around April of last year he was helping a friend lift a heavy nadir when he felt a pop in his back followed by pain radiating down the right leg.  He was evaluated in the emergency room.  Symptoms improved but then recurred somewhat.  His right leg will give out on him on occasion.  No significant left leg symptoms.  No bowel or bladder difficulties.  He has been treated with physical therapy.  He has been treated with steroids and tramadol.  He feels a little better lying on his right side.  He is prescribed Neurontin 600 mg 3 times a day.  Pain extends down the back of the right leg into the bottom of the foot.  He recently had an epidural injection at L3-4 and L4-5 which provided relief for a couple days and now pain has slowly recurred    Past Medical History:   Diagnosis Date    Carotid artery occlusion     Cervical disc disorder     Coronary artery disease 2018    Heart attack     x3         Current Outpatient Medications:     Alpha Lipoic Acid 200 MG capsule, Take 400 mg by mouth 3 (Three) Times a Day., Disp: 180 capsule, Rfl: 1    aspirin 81 MG EC tablet, Daily., Disp: , Rfl:     atorvastatin (LIPITOR) 40 MG tablet, Daily., Disp: , Rfl:     Brilinta 60 MG tablet tablet, Every 12 (Twelve) Hours., Disp: , Rfl:     Dietary Management Product (Rheumate) capsule, Take 1 capsule by mouth Daily., Disp: 90 capsule, Rfl: 0    gabapentin (NEURONTIN) 600 MG tablet, Take 1 tablet by mouth 3 (Three) Times a Day., Disp: 90 tablet, Rfl: 1    ibuprofen (ADVIL,MOTRIN) 800 MG tablet, , Disp: , Rfl:     isosorbide mononitrate (IMDUR) 30 MG 24 hr tablet, Daily., Disp: , Rfl:     metoprolol tartrate (LOPRESSOR) 25  "MG tablet, Every 12 (Twelve) Hours., Disp: , Rfl:     nitroglycerin (NITROSTAT) 0.4 MG SL tablet, 1 tablet., Disp: , Rfl:     nortriptyline (PAMELOR) 10 MG capsule, 1-2 tabs po qhs prn sleep, Disp: 60 capsule, Rfl: 0    potassium chloride 10 MEQ CR tablet, , Disp: , Rfl:     ranolazine (RANEXA) 1000 MG 12 hr tablet, Every 12 (Twelve) Hours., Disp: , Rfl:     vitamin B-6 (PYRIDOXINE) 100 MG tablet, Take 1 tablet by mouth Daily., Disp: 30 tablet, Rfl: 0    Zanaflex 4 MG tablet, Every 8 (Eight) Hours., Disp: , Rfl:     Past Surgical History:   Procedure Laterality Date    CAROTID STENT      CORONARY STENT PLACEMENT  03/2018    \" maker\"       Social History     Socioeconomic History    Marital status:    Tobacco Use    Smoking status: Every Day     Packs/day: 2.00     Years: 30.00     Additional pack years: 0.00     Total pack years: 60.00     Types: Cigarettes    Smokeless tobacco: Never   Substance and Sexual Activity    Alcohol use: Never    Drug use: Never    Sexual activity: Defer         Review of Systems   Constitutional:  Negative for activity change, appetite change, chills, diaphoresis, fatigue, fever and unexpected weight change.   HENT:  Negative for congestion, dental problem, drooling, ear discharge, ear pain, facial swelling, hearing loss, mouth sores, nosebleeds, postnasal drip, rhinorrhea, sinus pressure, sinus pain, sneezing, sore throat, tinnitus, trouble swallowing and voice change.    Eyes:  Negative for photophobia, pain, discharge, redness, itching and visual disturbance.   Respiratory:  Negative for apnea, cough, choking, chest tightness, shortness of breath, wheezing and stridor.    Cardiovascular:  Negative for chest pain, palpitations and leg swelling.   Gastrointestinal:  Positive for abdominal pain. Negative for abdominal distention, anal bleeding, blood in stool, constipation, diarrhea, nausea, rectal pain and vomiting.   Endocrine: Negative for cold intolerance, heat " "intolerance, polydipsia, polyphagia and polyuria.   Genitourinary:  Negative for decreased urine volume, difficulty urinating, dysuria, enuresis, flank pain, frequency, genital sores, hematuria, penile discharge, penile pain, penile swelling, scrotal swelling, testicular pain and urgency.   Musculoskeletal:  Positive for back pain and myalgias. Negative for arthralgias, gait problem, joint swelling, neck pain and neck stiffness.   Skin:  Negative for color change, pallor, rash and wound.   Allergic/Immunologic: Positive for food allergies. Negative for environmental allergies and immunocompromised state.   Neurological:  Positive for numbness. Negative for dizziness, tremors, seizures, syncope, facial asymmetry, speech difficulty, weakness, light-headedness and headaches.   Hematological:  Negative for adenopathy. Does not bruise/bleed easily.   Psychiatric/Behavioral:  Negative for agitation, behavioral problems, confusion, decreased concentration, dysphoric mood, hallucinations, self-injury, sleep disturbance and suicidal ideas. The patient is not nervous/anxious and is not hyperactive.        Objective   Vital Signs: Blood pressure 136/80, temperature 98.2 °F (36.8 °C), temperature source Infrared, height 172.7 cm (68\"), weight 76 kg (167 lb 9.6 oz).  Physical Exam  Vitals and nursing note reviewed.   Constitutional:       General: He is not in acute distress.     Appearance: He is well-developed.   HENT:      Head: Normocephalic and atraumatic.   Psychiatric:         Behavior: Behavior normal.         Thought Content: Thought content normal.     Musculoskeletal:     Strength is intact in upper and lower extremities to direct testing.     Station and gait are normal.     Straight leg raising is negative.   Neurologic:     Muscle tone is normal throughout.     Coordination is intact.     Deep tendon reflexes: 2+ and symmetrical.     Sensation is intact to light touch throughout.     Patient is oriented to person, " place, and time.         Independent review of radiographic imaging: MRI of the lumbar spine dated 9/7/2023 demonstrates degenerative disc disease most pronounced at L3-4 and L4-5.  There is some disc protrusion that narrows the recess on the right at L4-5 which could be the source for his symptoms.  There is some rightward narrowing at L3-4 to a lesser extent.    Assessment & Plan   Diagnosis: Lumbar degenerative disc disease with radiculopathy    Medical Decision Making: I suspect patient's symptoms are emanating from the L4-5 disc bulge; however, he leg pain does not match up completely with an L5 radiculopathy.  Dr. Pelayo has recommended further evaluation with a lumbar CT myelogram and electrodiagnostic studies to confirm the operative level.  He will need to come off of Brilinta a few days before.  I once again encouraged smoking cessation.  Patient will follow-up with Dr. Pelayo and further recommendations will be made at that time              Diagnoses and all orders for this visit:    1. Lumbar disc herniation with radiculopathy  -     gabapentin (NEURONTIN) 600 MG tablet; Take 1 tablet by mouth 3 (Three) Times a Day.  Dispense: 90 tablet; Refill: 1  -     IR Myelogram Lumbar Spine; Future  -     CT Lumbar Spine With Intrathecal Contrast; Future  -     EMG & Nerve Conduction Test; Future    2. Tobacco abuse  -     gabapentin (NEURONTIN) 600 MG tablet; Take 1 tablet by mouth 3 (Three) Times a Day.  Dispense: 90 tablet; Refill: 1  -     EMG & Nerve Conduction Test; Future    Other orders  -     Obtain Informed Consent; Standing  -     No Lab Testing Needed; Standing                        BMI is within normal parameters. No other follow-up for BMI required.         Yelena Calderón PA-C  Patient Care Team:  Roberta Abrams APRN as PCP - General (Family Medicine)  Leandro Covington MD as Consulting Physician (Pain Medicine)  Ivette Garrison MD as Consulting Physician (Cardiology)  Teetee Cheney,  JOSE ANTONIO (Family Medicine)

## 2024-02-09 ENCOUNTER — TELEPHONE (OUTPATIENT)
Dept: INFUSION THERAPY | Facility: HOSPITAL | Age: 44
End: 2024-02-09
Payer: MEDICARE

## 2024-02-09 NOTE — TELEPHONE ENCOUNTER
Pre procedure phone call------Reviewed date and time of myelogram, need to stop in registration, NPO after midnight, a.m. medications with sip of water, need for  and approximate length of stay. Patient's last dose of aspirin 81 mg was 2-6-2024 and last dose of Brilinta  was 2-7-2024. All   questions addressed.

## 2024-02-13 ENCOUNTER — HOSPITAL ENCOUNTER (OUTPATIENT)
Dept: GENERAL RADIOLOGY | Facility: HOSPITAL | Age: 44
Discharge: HOME | End: 2024-02-13
Payer: MEDICARE

## 2024-02-13 ENCOUNTER — HOSPITAL ENCOUNTER (OUTPATIENT)
Dept: CT IMAGING | Facility: HOSPITAL | Age: 44
Discharge: HOME | End: 2024-02-13
Payer: MEDICARE

## 2024-02-13 ENCOUNTER — HOSPITAL ENCOUNTER (OUTPATIENT)
Dept: NEUROLOGY | Facility: HOSPITAL | Age: 44
Discharge: HOME | End: 2024-02-13
Payer: MEDICARE

## 2024-02-13 VITALS
SYSTOLIC BLOOD PRESSURE: 118 MMHG | TEMPERATURE: 97.1 F | RESPIRATION RATE: 18 BRPM | WEIGHT: 165.4 LBS | DIASTOLIC BLOOD PRESSURE: 76 MMHG | OXYGEN SATURATION: 98 % | HEIGHT: 68 IN | BODY MASS INDEX: 25.07 KG/M2 | HEART RATE: 67 BPM

## 2024-02-13 DIAGNOSIS — Z72.0 TOBACCO ABUSE: ICD-10-CM

## 2024-02-13 DIAGNOSIS — M51.16 LUMBAR DISC HERNIATION WITH RADICULOPATHY: ICD-10-CM

## 2024-02-13 PROCEDURE — 25510000001 IOPAMIDOL 41 % SOLUTION: Performed by: PHYSICIAN ASSISTANT

## 2024-02-13 PROCEDURE — 72132 CT LUMBAR SPINE W/DYE: CPT

## 2024-02-13 PROCEDURE — 72240 MYELOGRAPHY NECK SPINE: CPT

## 2024-02-13 PROCEDURE — 25010000002 LIDOCAINE 1 % SOLUTION: Performed by: PHYSICIAN ASSISTANT

## 2024-02-13 PROCEDURE — 95886 MUSC TEST DONE W/N TEST COMP: CPT | Performed by: PSYCHIATRY & NEUROLOGY

## 2024-02-13 PROCEDURE — 72120 X-RAY BEND ONLY L-S SPINE: CPT

## 2024-02-13 PROCEDURE — 95909 NRV CNDJ TST 5-6 STUDIES: CPT

## 2024-02-13 PROCEDURE — 62284 INJECTION FOR MYELOGRAM: CPT | Performed by: NEUROLOGICAL SURGERY

## 2024-02-13 PROCEDURE — 62304 MYELOGRAPHY LUMBAR INJECTION: CPT

## 2024-02-13 PROCEDURE — 95909 NRV CNDJ TST 5-6 STUDIES: CPT | Performed by: PSYCHIATRY & NEUROLOGY

## 2024-02-13 PROCEDURE — 95886 MUSC TEST DONE W/N TEST COMP: CPT

## 2024-02-13 RX ORDER — LIDOCAINE HYDROCHLORIDE 10 MG/ML
10 INJECTION, SOLUTION INFILTRATION; PERINEURAL ONCE
Status: COMPLETED | OUTPATIENT
Start: 2024-02-13 | End: 2024-02-13

## 2024-02-13 RX ORDER — IOPAMIDOL 408 MG/ML
20 INJECTION, SOLUTION INTRATHECAL
Status: COMPLETED | OUTPATIENT
Start: 2024-02-13 | End: 2024-02-13

## 2024-02-13 RX ORDER — ONDANSETRON 4 MG/1
4 TABLET, ORALLY DISINTEGRATING ORAL ONCE AS NEEDED
Status: DISCONTINUED | OUTPATIENT
Start: 2024-02-13 | End: 2024-02-14 | Stop reason: HOSPADM

## 2024-02-13 RX ORDER — PROMETHAZINE HYDROCHLORIDE 25 MG/1
25 TABLET ORAL ONCE AS NEEDED
Status: DISCONTINUED | OUTPATIENT
Start: 2024-02-13 | End: 2024-02-14 | Stop reason: HOSPADM

## 2024-02-13 RX ORDER — TICAGRELOR 60 MG/1
60 TABLET ORAL EVERY 12 HOURS SCHEDULED
Start: 2024-02-14

## 2024-02-13 RX ADMIN — IOPAMIDOL 20 ML: 408 INJECTION, SOLUTION INTRATHECAL at 07:30

## 2024-02-13 RX ADMIN — LIDOCAINE HYDROCHLORIDE 10 ML: 10 INJECTION, SOLUTION INFILTRATION; PERINEURAL at 07:25

## 2024-02-14 ENCOUNTER — TELEPHONE (OUTPATIENT)
Dept: INFUSION THERAPY | Facility: HOSPITAL | Age: 44
End: 2024-02-14
Payer: MEDICARE

## 2024-02-21 ENCOUNTER — OFFICE VISIT (OUTPATIENT)
Dept: NEUROSURGERY | Facility: CLINIC | Age: 44
End: 2024-02-21
Payer: MEDICARE

## 2024-02-21 VITALS — BODY MASS INDEX: 25.9 KG/M2 | TEMPERATURE: 97.3 F | WEIGHT: 170.9 LBS | HEIGHT: 68 IN

## 2024-02-21 DIAGNOSIS — Z72.0 TOBACCO ABUSE: ICD-10-CM

## 2024-02-21 DIAGNOSIS — M51.16 LUMBAR DISC HERNIATION WITH RADICULOPATHY: Primary | ICD-10-CM

## 2024-02-21 DIAGNOSIS — M51.36 DISC DEGENERATION, LUMBAR: ICD-10-CM

## 2024-02-21 PROCEDURE — 1160F RVW MEDS BY RX/DR IN RCRD: CPT | Performed by: NEUROLOGICAL SURGERY

## 2024-02-21 PROCEDURE — 1159F MED LIST DOCD IN RCRD: CPT | Performed by: NEUROLOGICAL SURGERY

## 2024-02-21 PROCEDURE — 99213 OFFICE O/P EST LOW 20 MIN: CPT | Performed by: NEUROLOGICAL SURGERY

## 2024-02-21 RX ORDER — ERGOCALCIFEROL 1.25 MG/1
1 CAPSULE ORAL WEEKLY
COMMUNITY
Start: 2024-02-19

## 2024-02-21 NOTE — PROGRESS NOTES
Patient: Jose De Jesus Delong  : 1980    Primary Care Provider: Roberta Abrams APRN    Requesting Provider: As above        History    Chief Complaint: Low back and right leg pain.    History of Present Illness:  Mr. Delong is seen in follow-up.  He is a 43-year-old gentleman who has a history of multiple MIs with a stent placed in 2018.  He is on Brilinta.  Around April of last year he was helping a friend lift a heavy nadir when he felt a pop in his back followed by pain radiating down the right leg.  He was evaluated in the emergency room.  Symptoms improved but then recurred somewhat.  His right leg will give out on him on occasion.  No significant left leg symptoms.  No bowel or bladder difficulties.  He has been treated with physical therapy.  He has been treated with steroids and tramadol.  He feels a little better lying on his right side.  He is prescribed Neurontin 600 mg 3 times a day.  Pain extends down the back of the right leg into the bottom of the foot.  He recently had an epidural injection at L3-4 and L4-5 which provided relief for a couple days and now pain has slowly recurred.  He recently underwent a lumbar CT myelogram and since then his pain is actually been conservatively better.  When the pain was extending down to his leg it was going down into the posterior lateral aspects of the right calf.    Review of Systems   Constitutional:  Negative for activity change, appetite change, chills, diaphoresis, fatigue, fever and unexpected weight change.   HENT:  Negative for congestion, dental problem, drooling, ear discharge, ear pain, facial swelling, hearing loss, mouth sores, nosebleeds, postnasal drip, rhinorrhea, sinus pressure, sneezing, sore throat, tinnitus, trouble swallowing and voice change.    Eyes:  Negative for photophobia, pain, discharge, redness, itching and visual disturbance.   Respiratory:  Negative for apnea, cough, choking, chest tightness, shortness of breath, wheezing and  "stridor.    Cardiovascular:  Negative for chest pain, palpitations and leg swelling.   Gastrointestinal:  Negative for abdominal distention, abdominal pain, anal bleeding, blood in stool, constipation, diarrhea, nausea, rectal pain and vomiting.   Endocrine: Negative for cold intolerance, heat intolerance, polydipsia, polyphagia and polyuria.   Genitourinary:  Positive for flank pain. Negative for decreased urine volume, difficulty urinating, dysuria, enuresis, frequency, genital sores, hematuria and urgency.   Musculoskeletal:  Positive for arthralgias and back pain. Negative for gait problem, joint swelling, myalgias, neck pain and neck stiffness.   Skin:  Negative for color change, pallor, rash and wound.   Allergic/Immunologic: Negative for environmental allergies, food allergies and immunocompromised state.   Neurological:  Positive for numbness. Negative for dizziness, tremors, seizures, syncope, facial asymmetry, speech difficulty, weakness, light-headedness and headaches.   Hematological:  Negative for adenopathy. Does not bruise/bleed easily.   Psychiatric/Behavioral:  Negative for agitation, behavioral problems, confusion, decreased concentration, dysphoric mood, hallucinations, self-injury, sleep disturbance and suicidal ideas. The patient is not nervous/anxious and is not hyperactive.    All other systems reviewed and are negative.    The patient's past medical history, past surgical history, family history, and social history have been reviewed at length in the electronic medical record.      Physical Exam:   Temp 97.3 °F (36.3 °C) (Infrared)   Ht 172.7 cm (68\")   Wt 77.5 kg (170 lb 14.4 oz)   BMI 25.99 kg/m²   Deferred    Medical Decision Making    Data Review:   (All imaging studies were personally reviewed unless stated otherwise)   Mr. Delong is seen in follow-up.  He is a 43-year-old gentleman who has a history of multiple MIs with a stent placed in 2018.  He is on Brilinta.  Around April of " last year he was helping a friend lift a heavy jack when he felt a pop in his back followed by pain radiating down the right leg.  He was evaluated in the emergency room.  Symptoms improved but then recurred somewhat.  His right leg will give out on him on occasion.  No significant left leg symptoms.  No bowel or bladder difficulties.  He has been treated with physical therapy.  He has been treated with steroids and tramadol.  He feels a little better lying on his right side.  He is prescribed Neurontin 600 mg 3 times a day.  Pain extends down the back of the right leg into the bottom of the foot.  He recently had an epidural injection at L3-4 and L4-5 which provided relief for a couple days and now pain has slowly recurred     Lumbar CT myelogram demonstrates some return Tatian right greater than left at the L3-4 level although the patient may have a transitional vertebrae which would make this the L4-5 level.  It is conceivable that those findings account for his symptoms.    Electrodiagnostic studies of the right lower extremity were unremarkable.    Diagnosis:   Lumbar radiculopathy, improved.    Treatment Options:   The patient is feeling better and we will certainly hold off on any intervention given that as well as his medical comorbidities.  If symptoms worsen then we will need to consider whether to pursue foraminotomies on the right at the L3-4 level.  He will follow-up as needed.      Scribed for Babatunde Pelayo MD by Milly Noriega CMA. 2/21/2024 16:49 EST    I, Dr. Pelayo, personally performed the services described in the documentation, as scribed in my presence, and it is both accurate and complete.

## 2024-04-24 DIAGNOSIS — Z72.0 TOBACCO ABUSE: ICD-10-CM

## 2024-04-24 DIAGNOSIS — M51.16 LUMBAR DISC HERNIATION WITH RADICULOPATHY: ICD-10-CM

## 2024-04-24 NOTE — TELEPHONE ENCOUNTER
Caller: Jose De Jesus Delong    Relationship: Self    Best call back number: [phone]    Requested Prescriptions:   Requested Prescriptions     Pending Prescriptions Disp Refills    gabapentin (NEURONTIN) 600 MG tablet 90 tablet 1     Sig: Take 1 tablet by mouth 3 (Three) Times a Day.        Pharmacy where request should be sent:  [facility]    Last office visit with prescribing clinician: 2/21/2024   Last telemedicine visit with prescribing clinician: Visit date not found   Next office visit with prescribing clinician: Visit date not found     Additional details provided by patient: NONE LEFT    Does the patient have less than a 3 day supply:  [x] Yes  [] No    Would you like a call back once the refill request has been completed: [x] Yes [] No    If the office needs to give you a call back, can they leave a voicemail: [x] Yes [] No    Cassie Alegria Rep   04/24/24 10:12 EDT

## 2024-04-24 NOTE — TELEPHONE ENCOUNTER
"Provider:  Pierce  Surgery/Procedure:  LAMIN  Surgery/Procedure Date:    Last visit:   2/21/24  Next visit: NA     Reason for call:  Refill request for gabapentin pending. Last office note stated \"The patient is feeling better and we will certainly hold off on any intervention given that as well as his medical comorbidities. If symptoms worsen then we will need to consider whether to pursue foraminotomies on the right at the L3-4 level. He will follow-up as needed.\" Continue current medication or begin to wean?    Kamari:    1 03/18/2024 [id number] Gabapentin 600MG [facility] [employer] 90 30 03 [place]  Refill 01/30/2024 [facility] [place] [facility]  1 01/30/2024 [id number] Gabapentin 600MG [facility] [employer] 90 30 03 [place]  New 01/30/2024 [facility] [place] [facility]  1 12/24/2023 [id number] Gabapentin 600MG [facility] [employer] 90 30 03 [place]  Refill 11/22/2023 [facility] [place] [facility]  1 11/22/2023 [id number] Gabapentin 600MG [facility] [employer] 90 30 03 [place]  New 11/22/2023 [facility] [place] [facility]  1 10/17/2023 [id number] Gabapentin 300MG Babatunde Pelayo [employer] 90 33 03 [place]  "

## 2024-04-26 RX ORDER — GABAPENTIN 600 MG/1
TABLET ORAL
Qty: 12 TABLET | Refills: 0 | Status: SHIPPED | OUTPATIENT
Start: 2024-04-26 | End: 2024-05-03